# Patient Record
Sex: MALE | Race: WHITE | NOT HISPANIC OR LATINO | Employment: PART TIME | URBAN - METROPOLITAN AREA
[De-identification: names, ages, dates, MRNs, and addresses within clinical notes are randomized per-mention and may not be internally consistent; named-entity substitution may affect disease eponyms.]

---
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
## 2019-08-29 PROCEDURE — 12001 RPR S/N/AX/GEN/TRNK 2.5CM/<: CPT

## 2019-08-29 PROCEDURE — 99282 EMERGENCY DEPT VISIT SF MDM: CPT | Mod: 25

## 2019-08-30 ENCOUNTER — HOSPITAL ENCOUNTER (EMERGENCY)
Facility: OTHER | Age: 19
Discharge: HOME | End: 2019-08-30
Attending: EMERGENCY MEDICINE
Payer: COMMERCIAL

## 2019-08-30 VITALS
HEART RATE: 65 BPM | WEIGHT: 135 LBS | RESPIRATION RATE: 16 BRPM | TEMPERATURE: 98 F | OXYGEN SATURATION: 98 % | BODY MASS INDEX: 20.46 KG/M2 | DIASTOLIC BLOOD PRESSURE: 73 MMHG | SYSTOLIC BLOOD PRESSURE: 108 MMHG | HEIGHT: 68 IN

## 2019-08-30 DIAGNOSIS — S61.511A WRIST LACERATION, RIGHT, INITIAL ENCOUNTER: Primary | ICD-10-CM

## 2019-08-30 DIAGNOSIS — S61.219A FINGER LACERATION, INITIAL ENCOUNTER: ICD-10-CM

## 2019-08-30 PROCEDURE — 25000003 PHARM REV CODE 250: Performed by: EMERGENCY MEDICINE

## 2019-08-30 PROCEDURE — 12001 RPR S/N/AX/GEN/TRNK 2.5CM/<: CPT

## 2019-08-30 RX ORDER — LIDOCAINE HYDROCHLORIDE 10 MG/ML
10 INJECTION INFILTRATION; PERINEURAL
Status: COMPLETED | OUTPATIENT
Start: 2019-08-30 | End: 2019-08-30

## 2019-08-30 RX ADMIN — LIDOCAINE HYDROCHLORIDE 10 ML: 10 INJECTION, SOLUTION INFILTRATION; PERINEURAL at 12:08

## 2019-08-30 NOTE — ED TRIAGE NOTES
Pt reports to the ED with 2-3 inch lac noted to R wrist; bleeding controlled. Pt AAOx3 with NAD noted; VSS. Will continue to monitor pt.

## 2019-08-30 NOTE — ED PROVIDER NOTES
Encounter Date: 8/29/2019       History     Chief Complaint   Patient presents with    Laceration     to rt wrist from a  at about 5 pm, UTD on tetanus     HPI  Review of patient's allergies indicates:  No Known Allergies  No past medical history on file.  No past surgical history on file.  No family history on file.  Social History     Tobacco Use    Smoking status: Not on file   Substance Use Topics    Alcohol use: Not on file    Drug use: Not on file     Review of Systems    Physical Exam     Initial Vitals [08/29/19 2342]   BP Pulse Resp Temp SpO2   121/62 82 16 98.7 °F (37.1 °C) 97 %      MAP       --         Physical Exam    ED Course   Procedures  Labs Reviewed - No data to display       Imaging Results    None                               Clinical Impression:   {Add your Clinical Impression here. If you haven't documented one yet, please pend the note, finalize a Clinical Impression, and refresh your note before signing.:[id number]}

## 2019-08-30 NOTE — ED NOTES
2 Patient identifiers, allergies, and medications verified.    LOC: Patient is awake, alert and oriented X3. Pt aware of environment with an appropriate affect and speaking appropriate.    APPEARANCE: Patient resting comfortably, no acute distress noted, patient is clean and well groomed; clothing is properly fastened.    SKIN: 2-3 inch lac noted to R wrist area; bleeding controlled.    Musculoskeletal:  Normal ROM noted; moves all extremeties well, No swelling or tenderness noted.    RESPIRATORY: Airway is open and patent, unlabored, spontaneous bilateral respirations spontaneous; normal effort and rate.     CARDIAC: Normal rate and rhythm, no peripheral edema noted, capillary refill < 3 seconds.     ABDOMEN: Soft and non-tender upon palpation, no distention noted.     PULSES: 2+; symmetrical in all 4 extremities    NEUROLOGIC: PERRLA, symmetrical facial expression; normal sensation to all 4 extremities.    Call light within reach, continuous monitoring in place, remains in stretcher at 45 degree angle and wheels locked, VSS, will continue to monitor.

## 2019-08-30 NOTE — ED PROVIDER NOTES
Encounter Date: 8/29/2019    SCRIBE #1 NOTE: Kandace FLORES am scribing for, and in the presence of, Dr. Coker.       History     Chief Complaint   Patient presents with    Laceration     to rt wrist from a  at about 5 pm, UTD on tetanus     Time seen by provider: 12:35 AM    This is a 19 y.o. male who presents with complaint of a laceration to the right wrist about 7 hours ago. Pt states he was cooking at work when the  fell on his wrist. He states the pain was not immediate so he continued cooking. He has a laceration on his left index finger which is not as severe. He denies any associated pain.     The history is provided by the patient.     Review of patient's allergies indicates:  No Known Allergies  No past medical history on file.  No past surgical history on file.  No family history on file.  Social History     Tobacco Use    Smoking status: Not on file   Substance Use Topics    Alcohol use: Not on file    Drug use: Not on file     Review of Systems   Constitutional: Negative for chills and fever.   HENT: Negative for congestion, rhinorrhea and sore throat.    Eyes: Negative for visual disturbance.   Respiratory: Negative for cough and shortness of breath.    Cardiovascular: Negative for chest pain.   Gastrointestinal: Negative for abdominal pain, diarrhea, nausea and vomiting.   Genitourinary: Negative for dysuria.   Musculoskeletal: Negative for back pain.   Skin: Positive for wound (laceration to right wrist). Negative for rash.   Neurological: Negative for dizziness, weakness and light-headedness.   Psychiatric/Behavioral: Negative for confusion.       Physical Exam     Initial Vitals [08/29/19 2342]   BP Pulse Resp Temp SpO2   121/62 82 16 98.7 °F (37.1 °C) 97 %      MAP       --         Physical Exam    Nursing note and vitals reviewed.  Constitutional: He appears well-developed and well-nourished. He is not diaphoretic. No distress.   HENT:   Head: Normocephalic and  atraumatic.   Eyes: Conjunctivae and EOM are normal. Pupils are equal, round, and reactive to light. No scleral icterus.   Neck: Normal range of motion. Neck supple.   Cardiovascular: Normal rate, regular rhythm and normal heart sounds. Exam reveals no gallop and no friction rub.    No murmur heard.  Pulmonary/Chest: Breath sounds normal.   Musculoskeletal: Normal range of motion. He exhibits no edema or tenderness.   Neurological: He is alert and oriented to person, place, and time.   Skin: Skin is warm and dry. Laceration noted.   1.5 cm linear laceration to lateral aspect of the right wrist. Flexion extension in normal limits. 2+ pulses intact. Laceration is subcutaneous only. Superficial laceration to left index finger.         ED Course   Lac Repair  Date/Time: 8/30/2019 1:29 AM  Performed by: Shannan Coker MD  Authorized by: Shannan Coker MD   Consent Done: Not Needed  Body area: upper extremity  Location details: right wrist  Laceration length: 1.5 cm  Tendon involvement: none  Nerve involvement: none  Vascular damage: no    Anesthesia:  Local Anesthetic: lidocaine 1% without epinephrine  Patient sedated: no  Preparation: Patient was prepped and draped in the usual sterile fashion.  Irrigation solution: saline  Skin closure: 5-0 Prolene  Number of sutures: 3  Technique: simple  Dressing: 4x4 sterile gauze        Labs Reviewed - No data to display       Imaging Results    None             Additional MDM:   Comments: 19-year-old male presents with 2 lacerations.  He has 1 very superficial laceration that does not require closure on his left index finger.  He also has an approximately 1 and 0.5 cm laceration near his right wrist which was closed without complication.  His tetanus is up-to-date.  He was given precautions for seeking immediate re-evaluation the emergency department and was otherwise instructed to have his sutures removed in 10 days..          Scribe Attestation:   Scribe #1: I performed the above  scribed service and the documentation accurately describes the services I performed. I attest to the accuracy of the note.    Attending Attestation:           Physician Attestation for Scribe:  Physician Attestation Statement for Scribe #1: I, Dr. Coker, reviewed documentation, as scribed by Kandace Hutchinson in my presence, and it is both accurate and complete.                    Clinical Impression:     1. Wrist laceration, right, initial encounter    2. Finger laceration, initial encounter    '                             Shannan Coker MD  08/30/19 0321

## 2021-12-14 DIAGNOSIS — U07.1 COVID-19: Primary | ICD-10-CM

## 2021-12-15 ENCOUNTER — INFUSION (OUTPATIENT)
Dept: INFECTIOUS DISEASES | Facility: HOSPITAL | Age: 21
End: 2021-12-15
Attending: EMERGENCY MEDICINE

## 2021-12-15 VITALS
TEMPERATURE: 99 F | HEIGHT: 68 IN | BODY MASS INDEX: 22.73 KG/M2 | OXYGEN SATURATION: 96 % | WEIGHT: 150 LBS | RESPIRATION RATE: 16 BRPM | SYSTOLIC BLOOD PRESSURE: 121 MMHG | DIASTOLIC BLOOD PRESSURE: 82 MMHG | HEART RATE: 85 BPM

## 2021-12-15 DIAGNOSIS — U07.1 COVID-19: Primary | ICD-10-CM

## 2021-12-15 PROCEDURE — 63600175 PHARM REV CODE 636 W HCPCS: Performed by: INTERNAL MEDICINE

## 2021-12-15 PROCEDURE — M0243 CASIRIVI AND IMDEVI INFUSION: HCPCS | Performed by: INTERNAL MEDICINE

## 2021-12-15 PROCEDURE — 25000003 PHARM REV CODE 250: Performed by: INTERNAL MEDICINE

## 2021-12-15 RX ORDER — SODIUM CHLORIDE 0.9 % (FLUSH) 0.9 %
10 SYRINGE (ML) INJECTION
Status: ACTIVE | OUTPATIENT
Start: 2021-12-15

## 2021-12-15 RX ORDER — ALBUTEROL SULFATE 90 UG/1
2 AEROSOL, METERED RESPIRATORY (INHALATION)
Status: ACTIVE | OUTPATIENT
Start: 2021-12-15

## 2021-12-15 RX ORDER — ONDANSETRON 4 MG/1
4 TABLET, ORALLY DISINTEGRATING ORAL ONCE AS NEEDED
Status: ACTIVE | OUTPATIENT
Start: 2021-12-15 | End: 2033-05-13

## 2021-12-15 RX ORDER — ACETAMINOPHEN 325 MG/1
650 TABLET ORAL ONCE AS NEEDED
Status: ACTIVE | OUTPATIENT
Start: 2021-12-15 | End: 2033-05-13

## 2021-12-15 RX ORDER — DIPHENHYDRAMINE HYDROCHLORIDE 50 MG/ML
25 INJECTION INTRAMUSCULAR; INTRAVENOUS ONCE AS NEEDED
Status: ACTIVE | OUTPATIENT
Start: 2021-12-15 | End: 2033-05-13

## 2021-12-15 RX ORDER — EPINEPHRINE 0.3 MG/.3ML
0.3 INJECTION SUBCUTANEOUS
Status: ACTIVE | OUTPATIENT
Start: 2021-12-15

## 2021-12-15 RX ADMIN — CASIRIVIMAB AND IMDEVIMAB 600 MG: 600; 600 INJECTION, SOLUTION, CONCENTRATE INTRAVENOUS at 11:12

## 2023-04-05 ENCOUNTER — TELEPHONE (OUTPATIENT)
Dept: SLEEP MEDICINE | Facility: CLINIC | Age: 23
End: 2023-04-05
Payer: COMMERCIAL

## 2023-04-05 NOTE — TELEPHONE ENCOUNTER
----- Message from Jacklyn Tsang sent at 4/4/2023  4:20 PM CDT -----  Regarding: appointment  Name of Who is Calling: Sampson           What is the request in detail: Patient is requesting  a call back to schedule  form the referral Dr. Allen faxed over. He has [employer] but system is down but he is eligible.           Can the clinic reply by [facility]: No           What Number to Call Back if not in [facility]: [phone]

## 2023-07-12 ENCOUNTER — TELEPHONE (OUTPATIENT)
Dept: SLEEP MEDICINE | Facility: CLINIC | Age: 23
End: 2023-07-12
Payer: COMMERCIAL

## 2023-07-12 NOTE — TELEPHONE ENCOUNTER
----- Message from Jayne Goodman sent at 7/12/2023  4:05 PM CDT -----  Regarding: New Patient appt  Name of caller: Sampson       What is the requesting detail: pt is requesting a call back to schedule a new pt appt. Please advise       Can the clinic reply by [facility]:       What number to call back:[phone]